# Patient Record
Sex: MALE | HISPANIC OR LATINO | Employment: FULL TIME | URBAN - METROPOLITAN AREA
[De-identification: names, ages, dates, MRNs, and addresses within clinical notes are randomized per-mention and may not be internally consistent; named-entity substitution may affect disease eponyms.]

---

## 2017-06-07 ENCOUNTER — OFFICE VISIT (OUTPATIENT)
Dept: MEDICAL GROUP | Facility: PHYSICIAN GROUP | Age: 22
End: 2017-06-07
Payer: COMMERCIAL

## 2017-06-07 VITALS
HEIGHT: 72 IN | TEMPERATURE: 98.6 F | OXYGEN SATURATION: 96 % | HEART RATE: 86 BPM | RESPIRATION RATE: 16 BRPM | DIASTOLIC BLOOD PRESSURE: 80 MMHG | BODY MASS INDEX: 35.76 KG/M2 | SYSTOLIC BLOOD PRESSURE: 118 MMHG | WEIGHT: 264 LBS

## 2017-06-07 DIAGNOSIS — Z76.89 ENCOUNTER TO ESTABLISH CARE WITH NEW DOCTOR: ICD-10-CM

## 2017-06-07 DIAGNOSIS — L30.9 ECZEMA, UNSPECIFIED TYPE: ICD-10-CM

## 2017-06-07 PROCEDURE — 99203 OFFICE O/P NEW LOW 30 MIN: CPT | Performed by: NURSE PRACTITIONER

## 2017-06-07 ASSESSMENT — PATIENT HEALTH QUESTIONNAIRE - PHQ9: CLINICAL INTERPRETATION OF PHQ2 SCORE: 0

## 2017-06-07 NOTE — MR AVS SNAPSHOT
Laci Foster   2017 9:00 AM   Office Visit   MRN: [id number]    Department:  [facility]   Dept Phone:  [phone]    Description:  Male : 1995   Provider:  GEETHA Mccall           Reason for Visit     Establish Care           Allergies as of 2017     Allergen Noted Reactions    No Known Drug Allergy 2008         You were diagnosed with     Encounter to establish care with new doctor   [[id number]]       BMI 35.0-35.9,adult   [459209]         Vital Signs     Blood Pressure Pulse Temperature Respirations Height Weight    118/80 mmHg 86 37 °C (98.6 °F) 16 1.829 m (6') 119.75 kg (264 lb)    Body Mass Index Oxygen Saturation Smoking Status             35.80 kg/m2 96% Never Smoker          Basic Information     Date Of Birth Sex Race Ethnicity Preferred Language    1995 Male  or   Origin (Albanian,Yemeni,Tongan,Barry, etc) English      Problem List              ICD-10-CM Priority Class Noted - Resolved    ASTHMA    2009 - Present    Allergic rhinitis    2009 - Present    Eczema L30.9   2010 - Present    Encounter to establish care with new doctor Z71.89   2017 - Present    BMI 35.0-35.9,adult Z68.35   2017 - Present      Health Maintenance        Date Due Completion Dates    IMM HEP B VACCINE (1 of 3 - Primary Series) 1995 ---    IMM HEP A VACCINE (1 of 2 - Standard Series) 1998 ---    IMM HPV VACCINE (1 of 3 - Male 3 Dose Series) 2008 ---    IMM VARICELLA (CHICKENPOX) VACCINE (1 of 2 - 2 Dose Adolescent Series) 2010 ---    IMM MENINGOCOCCAL VACCINE (MCV4) (2 of 2) 2013    IMM PNEUMOCOCCAL 19-64 (ADULT) MEDIUM RISK SERIES (1 of 1 - PPSV23) 2016 ---    IMM DTaP/Tdap/Td Vaccine (2 - Td) 2019            Current Immunizations     Meningococcal Conjugate Vaccine MCV4 (Menactra) 2009    Tdap Vaccine 2009      Below and/or attached are the medications your provider  expects you to take. Review all of your home medications and newly ordered medications with your provider and/or pharmacist. Follow medication instructions as directed by your provider and/or pharmacist. Please keep your medication list with you and share with your provider. Update the information when medications are discontinued, doses are changed, or new medications (including over-the-counter products) are added; and carry medication information at all times in the event of emergency situations     Allergies:  NO KNOWN DRUG ALLERGY - (reactions not documented)               Medications  Valid as of: June 07, 2017 -  9:18 AM    Generic Name Brand Name Tablet Size Instructions for use    .                 Medicines prescribed today were sent to:     [facility]ACY #[id number] - [place], [place] - [address]    [address] [place] [address]    Phone: [phone] Fax: [phone]    Open 24 Hours?: No      Medication refill instructions:       If your prescription bottle indicates you have medication refills left, it is not necessary to call your provider’s office. Please contact your pharmacy and they will refill your medication.    If your prescription bottle indicates you do not have any refills left, you may request refills at any time through one of the following ways: The online Vitasol system (except Urgent Care), by calling your provider’s office, or by asking your pharmacy to contact your provider’s office with a refill request. Medication refills are processed only during regular business hours and may not be available until the next business day. Your provider may request additional information or to have a follow-up visit with you prior to refilling your medication.   *Please Note: Medication refills are assigned a new Rx number when refilled electronically. Your pharmacy may indicate that no refills were authorized even though a new prescription for the same medication is available at the pharmacy.  Please request the medicine by name with the pharmacy before contacting your provider for a refill.           Graspr Access Code: [email or web address]  Expires: 7/7/2017  8:30 AM    Graspr  A secure, online tool to manage your health information     [employer]’s Graspr® is a secure, online tool that connects you to your personalized health information from the privacy of your home -- day or night - making it very easy for you to manage your healthcare. Once the activation process is completed, you can even access your medical information using the Graspr forest, which is available for free in the [employer] Forest store or Google Play store.     Graspr provides the following levels of access (as shown below):   My Chart Features   [facility] Doctor [facility]  Specialists [facility]  Urgent  Care Non-[facility]  Primary Care  Doctor   Email your healthcare team securely and privately 24/7 X X X    Manage appointments: schedule your next appointment; view details of past/upcoming appointments X      Request prescription refills. X      View recent personal medical records, including lab and immunizations X X X X   View health record, including health history, allergies, medications X X X X   Read reports about your outpatient visits, procedures, consult and ER notes X X X X   See your discharge summary, which is a recap of your hospital and/or ER visit that includes your diagnosis, lab results, and care plan. X X       How to register for Graspr:  1. Go to  [email or web address].  2. Click on the Sign Up Now box, which takes you to the New Member Sign Up page. You will need to provide the following information:  a. Enter your Graspr Access Code exactly as it appears at the top of this page. (You will not need to use this code after you’ve completed the sign-up process. If you do not sign up before the expiration date, you must request a new code.)   b. Enter your date of birth.   c. Enter your home email address.    d. Click Submit, and follow the next screen’s instructions.  3. Create a [employer] ID. This will be your [employer] login ID and cannot be changed, so think of one that is secure and easy to remember.  4. Create a [employer] password. You can change your password at any time.  5. Enter your Password Reset Question and Answer. This can be used at a later time if you forget your password.   6. Enter your e-mail address. This allows you to receive e-mail notifications when new information is available in [employer].  7. Click Sign Up. You can now view your health information.    For assistance activating your [employer] account, call [phone]

## 2017-06-07 NOTE — ASSESSMENT & PLAN NOTE
Seasonal allergies are well-controlled this year.  He hasn't been having issues with them for the past several years.

## 2017-06-07 NOTE — ASSESSMENT & PLAN NOTE
Hasn't used an inhaler in many years.  Can not recall the last time he felt short of breath or had issues with wheezing.

## 2017-06-07 NOTE — PROGRESS NOTES
Chief Complaint   Patient presents with   • Establish Care       HISTORY OF PRESENT ILLNESS: Patient is a 22 y.o. male new patient who presents today to discuss the following issues:    Encounter to establish care with new doctor  Is here to establish with a new primary care provider.  Was previously seen by Celia Jaime.      BMI 35.0-35.9,adult  Patient is aware of BMI elevation.  Brief discussion of diet, exercise, and lifestyle modification.      Eczema  Stable.  Gets a dry patch every once in a while, but nothing that has required treatment for many years.    Allergic Rhinitis  Seasonal allergies are well-controlled this year.  He hasn't been having issues with them for the past several years.    Asthma  Hasn't used an inhaler in many years.  Can not recall the last time he felt short of breath or had issues with wheezing.      Patient Active Problem List    Diagnosis Date Noted   • Encounter to establish care with new doctor 06/07/2017   • BMI 35.0-35.9,adult 06/07/2017   • Eczema 04/19/2010   • ASTHMA 07/31/2009   • Allergic rhinitis 07/31/2009       Allergies:No known drug allergy    No current outpatient prescriptions on file.     No current facility-administered medications for this visit.       Social History   Substance Use Topics   • Smoking status: Never Smoker    • Smokeless tobacco: Never Used   • Alcohol Use: 0.0 oz/week     0 Standard drinks or equivalent per week      Comment: rare       No family status information on file.     Family History   Problem Relation Age of Onset   • Cancer Maternal Aunt      breast x 2 aunts       Review of Systems:   Constitutional: Negative for fever, chills, weight loss and malaise/fatigue.   HENT: Negative for ear pain, nosebleeds, congestion, sore throat and neck pain.    Eyes: Negative for blurred vision.   Respiratory: Negative for cough, sputum production, shortness of breath and wheezing.    Cardiovascular: Negative for chest pain, palpitations,  orthopnea and leg swelling.   Gastrointestinal: Negative for heartburn, nausea, vomiting and abdominal pain.   Genitourinary: Negative for dysuria, urgency and frequency.   Musculoskeletal: Negative for myalgias, joint pain, and back pain.  Skin: Negative for rash and itching.   Neurological: Negative for dizziness, tingling, tremors, sensory change, focal weakness and headaches.   Endo/Heme/Allergies: Does not bruise/bleed easily.   Psychiatric/Behavioral: Negative for depression, suicidal ideas and memory loss.  The patient is not nervous/anxious and does not have insomnia.    All other systems reviewed and are negative except as in HPI.    Exam:  Blood pressure 118/80, pulse 86, temperature 37 °C (98.6 °F), resp. rate 16, height 1.829 m (6'), weight 119.75 kg (264 lb), SpO2 96 %.  General:  Well nourished, well developed male in NAD  Head: Grossly normal.  Neck: Supple without JVD or bruit. Thyroid is not enlarged.  Pulmonary: Clear to ausculation. Normal effort. No rales, ronchi, or wheezing.  Cardiovascular: Regular rate and rhythm without murmur.   Extremities: No clubbing, cyanosis, or edema.  Skin: Intact with no obvious rashes or lesions.  Neuro: Grossly intact.  Psych: Alert and oriented x 3.  Mood and affect appropriate.    Medical decision-making and discussion: Laci is here to establish with a new primary care provider.  We reviewed his past medical history and discussed his current medications.. He will sign a records release for his previous provider, he will sign up with [facility], and he will plan to follow-up here as needed.         Assessment/Plan:  1. Encounter to establish care with new doctor     2. BMI 35.0-35.9,adult  Patient identified as having weight management issue.  Appropriate orders and counseling given.   3. Eczema, unspecified type         Return if symptoms worsen or fail to improve.    Please note that this dictation was created using voice recognition software. I have made every  reasonable attempt to correct obvious errors, but I expect that there are errors of grammar and possibly content that I did not discover before finalizing the note.

## 2017-06-07 NOTE — ASSESSMENT & PLAN NOTE
Stable.  Gets a dry patch every once in a while, but nothing that has required treatment for many years.

## 2017-07-30 ENCOUNTER — OFFICE VISIT (OUTPATIENT)
Dept: URGENT CARE | Facility: PHYSICIAN GROUP | Age: 22
End: 2017-07-30
Payer: COMMERCIAL

## 2017-07-30 ENCOUNTER — HOSPITAL ENCOUNTER (OUTPATIENT)
Dept: RADIOLOGY | Facility: MEDICAL CENTER | Age: 22
End: 2017-07-30
Attending: FAMILY MEDICINE
Payer: COMMERCIAL

## 2017-07-30 ENCOUNTER — HOSPITAL ENCOUNTER (OUTPATIENT)
Facility: MEDICAL CENTER | Age: 22
End: 2017-07-30
Attending: FAMILY MEDICINE
Payer: COMMERCIAL

## 2017-07-30 VITALS
HEART RATE: 90 BPM | SYSTOLIC BLOOD PRESSURE: 116 MMHG | WEIGHT: 260 LBS | TEMPERATURE: 98.6 F | HEIGHT: 73 IN | OXYGEN SATURATION: 98 % | DIASTOLIC BLOOD PRESSURE: 80 MMHG | RESPIRATION RATE: 14 BRPM | BODY MASS INDEX: 34.46 KG/M2

## 2017-07-30 DIAGNOSIS — R19.8 UMBILICUS DISCHARGE: ICD-10-CM

## 2017-07-30 LAB
GRAM STN SPEC: NORMAL
SIGNIFICANT IND 70042: NORMAL
SITE SITE: NORMAL
SOURCE SOURCE: NORMAL

## 2017-07-30 PROCEDURE — 87070 CULTURE OTHR SPECIMN AEROBIC: CPT

## 2017-07-30 PROCEDURE — 99214 OFFICE O/P EST MOD 30 MIN: CPT | Performed by: FAMILY MEDICINE

## 2017-07-30 PROCEDURE — 87075 CULTR BACTERIA EXCEPT BLOOD: CPT

## 2017-07-30 PROCEDURE — 76705 ECHO EXAM OF ABDOMEN: CPT

## 2017-07-30 PROCEDURE — 87205 SMEAR GRAM STAIN: CPT

## 2017-07-30 RX ORDER — AMOXICILLIN 500 MG/1
500 CAPSULE ORAL 3 TIMES DAILY
Qty: 21 CAP | Refills: 0 | Status: SHIPPED | OUTPATIENT
Start: 2017-07-30 | End: 2017-08-06

## 2017-07-30 NOTE — MR AVS SNAPSHOT
"        Laci Foster   2017 9:30 AM   Office Visit   MRN: [id number]    Department:  [facility]   Dept Phone:  [phone]    Description:  Male : 1995   Provider:  Jose Alejandro Novak M.D.           Reason for Visit     Other irritation, bleeding from navel x2 days      Allergies as of 2017     Allergen Noted Reactions    No Known Drug Allergy 2008         You were diagnosed with     Umbilicus discharge   [812221]         Vital Signs     Blood Pressure Pulse Temperature Respirations Height Weight    116/80 mmHg 90 37 °C (98.6 °F) 14 1.854 m (6' 1\") 117.935 kg (260 lb)    Body Mass Index Oxygen Saturation Smoking Status             34.31 kg/m2 98% Never Smoker          Basic Information     Date Of Birth Sex Race Ethnicity Preferred Language    1995 Male  or   Origin (St Helenian,Niuean,Mauritanian,Belarusian, etc) English      Your appointments     2017 11:30 AM   US BODY 30 with VISTA US 1   [facility]AG[facility]IST[facility] ([place])    [address] [facility] [id number] [phone]              Problem List              ICD-10-CM Priority Class Noted - Resolved    ASTHMA    2009 - Present    Allergic rhinitis    2009 - Present    Eczema L30.9   2010 - Present    Encounter to establish care with new doctor Z71.89   2017 - Present    BMI 35.0-35.9,adult Z68.35   2017 - Present      Health Maintenance        Date Due Completion Dates    IMM HEP B VACCINE (1 of 3 - Primary Series) 1997 ---    IMM HEP A VACCINE (1 of 2 - Standard Series) 1998 ---    IMM HPV VACCINE (1 of 3 - Male 3 Dose Series) 2008 ---    IMM VARICELLA (CHICKENPOX) VACCINE (1 of 2 - 2 Dose Adolescent Series) 2010 ---    IMM MENINGOCOCCAL VACCINE (MCV4) (2 of 2) 2013    IMM PNEUMOCOCCAL 19-64 (ADULT) MEDIUM RISK SERIES (1 of 1 - PPSV23) 2016 ---    IMM INFLUENZA (1) 2017 ---    IMM DTaP/Tdap/Td Vaccine (2 - Td) 2019            Current " Immunizations     Meningococcal Conjugate Vaccine MCV4 (Menactra) 7/31/2009    Tdap Vaccine 7/31/2009      Below and/or attached are the medications your provider expects you to take. Review all of your home medications and newly ordered medications with your provider and/or pharmacist. Follow medication instructions as directed by your provider and/or pharmacist. Please keep your medication list with you and share with your provider. Update the information when medications are discontinued, doses are changed, or new medications (including over-the-counter products) are added; and carry medication information at all times in the event of emergency situations     Allergies:  NO KNOWN DRUG ALLERGY - (reactions not documented)               Medications  Valid as of: July 30, 2017 - 10:10 AM    Generic Name Brand Name Tablet Size Instructions for use    Amoxicillin (Cap) AMOXIL 500 MG Take 1 Cap by mouth 3 times a day for 7 days.        .                 Medicines prescribed today were sent to:     [id number] - [place], [place] - [address] [facility].    [address] [facility] [place] [address]    Phone: [phone] Fax: [phone]    Open 24 Hours?: No      Medication refill instructions:       If your prescription bottle indicates you have medication refills left, it is not necessary to call your provider’s office. Please contact your pharmacy and they will refill your medication.    If your prescription bottle indicates you do not have any refills left, you may request refills at any time through one of the following ways: The online Taggled system (except Urgent Care), by calling your provider’s office, or by asking your pharmacy to contact your provider’s office with a refill request. Medication refills are processed only during regular business hours and may not be available until the next business day. Your provider may request additional information or to have a follow-up visit with you prior to refilling your  medication.   *Please Note: Medication refills are assigned a new Rx number when refilled electronically. Your pharmacy may indicate that no refills were authorized even though a new prescription for the same medication is available at the pharmacy. Please request the medicine by name with the pharmacy before contacting your provider for a refill.        Your To Do List     Future Labs/Procedures Complete By Expires    US-HERNIA ABDOMEN  As directed 7/30/2018         [email or web address] Access Code: Activation code not generated  Current [email or web address] Status: Active

## 2017-07-30 NOTE — Clinical Note
July 30, 2017       Patient: Laci Foster   YOB: 1995   Date of Visit: 7/30/2017         To Whom It May Concern:    It is my medical opinion that Laci Foster not  lift more than 10 lbs x 1 week.    If you have any questions or concerns, please don't hesitate to call [phone]          Sincerely,          Jose Alejandro Novak M.D.  Electronically Signed

## 2017-07-30 NOTE — PROGRESS NOTES
"SUBJECTIVE      Chief Complaint   Patient presents with   • Other     irritation, bleeding from navel x2 days                  This is a new problem.  C/o bleeding from naval x 2 d.   Denies trauma, foreign body.  The problem has been gradually worsening since onset. Pain location: umbilicus - constant \"soreness\", sore to the touch and draining bloody fluid.   Pt was exposed to nothing. Pertinent negatives include no congestion, cough, fatigue, fever or shortness of breath. Past treatments include nothing.     History   Substance Use Topics   • Smoking status: Never Smoker    • Smokeless tobacco: No    • Alcohol Use: No      Past medical history was unremarkable and not pertinent to current issue      Family History   Problem Relation Age of Onset   • Cancer Maternal Aunt      breast x 2 aunts         Review of Systems   Constitutional: Negative for fever and fatigue.   HENT: Negative for congestion.    Respiratory: Negative for cough and shortness of breath.    Cardiovascular: Negative for chest pain.   Gastrointestinal: Negative for abdominal pain.   Skin: Positive for rash.   Neurological: Negative for dizziness.   All other systems reviewed and are negative.         Objective:     Blood pressure 116/80, pulse 90, temperature 37 °C (98.6 °F), resp. rate 14, height 1.854 m (6' 1\"), weight 117.935 kg (260 lb), SpO2 98 %.      Physical Exam   Constitutional: pt is oriented to person, place, and time. Pt appears well-developed and well-nourished. No distress.   HENT:   Head: Normocephalic and atraumatic.   Eyes: Conjunctivae are normal. No scleral icterus.   Cardiovascular: Normal rate and regular rhythm.    Pulmonary/Chest: Effort normal and breath sounds normal. No respiratory distress.        Neurological: pt is alert and oriented to person, place, and time. No cranial nerve deficit.   Skin: Skin is warm. Pt is not diaphoretic.       umbilicus - there is no erythema.   serosanguinous drainage noted. + TTP.   There " is no obvious hernia      Nursing note and vitals reviewed.              Assessment/Plan:          1. Umbilicus discharge  U/s ordered to rule out hernia  Will start on amoxicillin for the cellulitis    - ANAEROBIC/AEROBIC/GRAM STAIN  - US-HERNIA ABDOMEN; Future  - amoxicillin (AMOXIL) 500 MG Cap; Take 1 Cap by mouth 3 times a day for 7 days.  Dispense: 21 Cap; Refill: 0

## 2017-08-01 LAB
BACTERIA WND AEROBE CULT: NORMAL
GRAM STN SPEC: NORMAL
SIGNIFICANT IND 70042: NORMAL
SITE SITE: NORMAL
SOURCE SOURCE: NORMAL

## 2017-08-02 LAB
BACTERIA SPEC ANAEROBE CULT: NORMAL
SIGNIFICANT IND 70042: NORMAL
SITE SITE: NORMAL
SOURCE SOURCE: NORMAL

## 2019-09-25 ENCOUNTER — OFFICE VISIT (OUTPATIENT)
Dept: MEDICAL GROUP | Facility: PHYSICIAN GROUP | Age: 24
End: 2019-09-25
Payer: COMMERCIAL

## 2019-09-25 VITALS
HEIGHT: 72 IN | HEART RATE: 89 BPM | TEMPERATURE: 98.9 F | WEIGHT: 265 LBS | OXYGEN SATURATION: 95 % | SYSTOLIC BLOOD PRESSURE: 122 MMHG | RESPIRATION RATE: 16 BRPM | DIASTOLIC BLOOD PRESSURE: 82 MMHG | BODY MASS INDEX: 35.89 KG/M2

## 2019-09-25 DIAGNOSIS — Z98.890 HISTORY OF FOOT SURGERY: ICD-10-CM

## 2019-09-25 DIAGNOSIS — Z00.00 ENCOUNTER FOR WELLNESS EXAMINATION: ICD-10-CM

## 2019-09-25 DIAGNOSIS — Z23 NEED FOR VACCINATION: ICD-10-CM

## 2019-09-25 PROBLEM — Z76.89 ENCOUNTER TO ESTABLISH CARE WITH NEW DOCTOR: Status: RESOLVED | Noted: 2017-06-07 | Resolved: 2019-09-25

## 2019-09-25 PROCEDURE — 90686 IIV4 VACC NO PRSV 0.5 ML IM: CPT | Performed by: NURSE PRACTITIONER

## 2019-09-25 PROCEDURE — 99212 OFFICE O/P EST SF 10 MIN: CPT | Mod: 25 | Performed by: NURSE PRACTITIONER

## 2019-09-25 PROCEDURE — 90471 IMMUNIZATION ADMIN: CPT | Performed by: NURSE PRACTITIONER

## 2019-09-25 PROCEDURE — 90472 IMMUNIZATION ADMIN EACH ADD: CPT | Performed by: NURSE PRACTITIONER

## 2019-09-25 PROCEDURE — 90715 TDAP VACCINE 7 YRS/> IM: CPT | Performed by: NURSE PRACTITIONER

## 2019-09-25 ASSESSMENT — PATIENT HEALTH QUESTIONNAIRE - PHQ9: CLINICAL INTERPRETATION OF PHQ2 SCORE: 0

## 2019-09-25 NOTE — PROGRESS NOTES
Chief Complaint   Patient presents with   • Annual Exam   • Foot Problem       HISTORY OF PRESENT ILLNESS: Patient is a 24 y.o. male established patient who presents today to discuss the following issues:    Encounter for wellness examination  Is here for his annual wellness visit.  Has no current complaints.    History of foot surgery  Had left foot surgery in 2008 and right foot surgery in 2009 with Dr. Matos.  He was told that he would need surgery again in the future, but he doesn't remember when.  Discussed options, and we will proceed with a referral back to Dr. Matos for evaluation.    Need for vaccination  Due for flu and Tdap today.      Patient Active Problem List    Diagnosis Date Noted   • History of foot surgery 09/25/2019   • Need for vaccination 09/25/2019   • Encounter for wellness examination 09/25/2019   • BMI 35.0-35.9,adult 06/07/2017   • Eczema 04/19/2010   • ASTHMA 07/31/2009   • Allergic rhinitis 07/31/2009       Allergies:No known drug allergy    No current outpatient medications on file.     No current facility-administered medications for this visit.        Social History     Tobacco Use   • Smoking status: Never Smoker   • Smokeless tobacco: Never Used   Substance Use Topics   • Alcohol use: Yes     Alcohol/week: 0.0 oz     Comment: rare   • Drug use: Yes     Types: Marijuana       Family Status   Relation Name Status   • MAunt  (Not Specified)     Family History   Problem Relation Age of Onset   • Cancer Maternal Aunt         breast x 2 aunts       Review of Systems:   Constitutional: Negative for fever, chills, weight loss and malaise/fatigue.   HENT: Negative for ear pain, nosebleeds, congestion, sore throat and neck pain.    Eyes: Negative for blurred vision.   Respiratory: Negative for cough, sputum production, shortness of breath and wheezing.    Cardiovascular: Negative for chest pain, palpitations, orthopnea and leg swelling.   Gastrointestinal: Negative for heartburn, nausea,  vomiting and abdominal pain.   Genitourinary: Negative for dysuria, urgency and frequency.   Musculoskeletal: Negative for myalgias, joint pain, and back pain.  Skin: Negative for rash and itching.   Neurological: Negative for dizziness, tingling, tremors, sensory change, focal weakness and headaches.   Endo/Heme/Allergies: Does not bruise/bleed easily.   Psychiatric/Behavioral: Negative for depression, suicidal ideas and memory loss.  The patient is not nervous/anxious and does not have insomnia.    All other systems reviewed and are negative except as in HPI.    Exam:  Blood Pressure 122/82   Pulse 89   Temperature 37.2 °C (98.9 °F)   Respiration 16   Height 1.829 m (6')   Weight 120.2 kg (265 lb)   Oxygen Saturation 95%   General:  Well nourished, well developed male in NAD  Head: Grossly normal.  Neck: Supple without JVD or bruit. Thyroid is not enlarged.  Pulmonary: Clear to ausculation. Normal effort. No rales, ronchi, or wheezing.  Cardiovascular: Regular rate and rhythm without murmur.   Abdomen:  Soft, nontender, nondistended.  Extremities: No clubbing, cyanosis, or edema.  Skin: Intact with no obvious rashes or lesions.  Neuro: Grossly intact.  Psych: Alert and oriented x 3.  Mood and affect appropriate.    Medical decision-making and discussion: Laci is here today to discuss wellness.  His records were reviewed, a referral was sent to Dr. Matos, and he was given flu and Tdap shots.  He will follow-up here as needed.    I have placed the below orders and discussed them with an approved delegating provider. The MA is performing the below orders under the direction of Dr. Cordova, who have provided verbal consent for supervision.            Assessment/Plan:  1. Need for vaccination  TDAP VACCINE =>8YO IM    Influenza Vaccine Quad Injection (PF)   2. History of foot surgery  REFERRAL TO PODIATRY   3. Encounter for wellness examination         Return if symptoms worsen or fail to improve.    Please note  that this dictation was created using voice recognition software. I have made every reasonable attempt to correct obvious errors, but I expect that there are errors of grammar and possibly content that I did not discover before finalizing the note.

## 2019-09-26 ENCOUNTER — TELEPHONE (OUTPATIENT)
Dept: MEDICAL GROUP | Facility: PHYSICIAN GROUP | Age: 24
End: 2019-09-26

## 2019-09-26 DIAGNOSIS — Z00.00 ENCOUNTER FOR WELLNESS EXAMINATION: ICD-10-CM

## 2019-09-26 NOTE — TELEPHONE ENCOUNTER
----- Message from Laci Foster sent at 9/25/2019  5:50 PM PDT -----  Regarding: Test Result Question  Contact: [phone]  Bennett Mercado,     I had an appointment earlier and I forgot to mention I needed a order put in to have my blood checked for cholesterol for my insurance for the yearly wellness check. I was hoping if you could put one in so I can come in sometime soon to have it done.    Thank you!

## 2019-09-26 NOTE — ASSESSMENT & PLAN NOTE
Had left foot surgery in 2008 and right foot surgery in 2009 with Dr. Matos.  He was told that he would need surgery again in the future, but he doesn't remember when.  Discussed options, and we will proceed with a referral back to Dr. Matos for evaluation.

## 2019-11-14 ENCOUNTER — HOSPITAL ENCOUNTER (OUTPATIENT)
Dept: LAB | Facility: MEDICAL CENTER | Age: 24
End: 2019-11-14
Attending: NURSE PRACTITIONER
Payer: COMMERCIAL

## 2019-11-14 DIAGNOSIS — Z00.00 ENCOUNTER FOR WELLNESS EXAMINATION: ICD-10-CM

## 2019-11-14 LAB
CHOLEST SERPL-MCNC: 163 MG/DL (ref 100–199)
FASTING STATUS PATIENT QL REPORTED: NORMAL
HDLC SERPL-MCNC: 39 MG/DL
LDLC SERPL CALC-MCNC: 93 MG/DL
TRIGL SERPL-MCNC: 157 MG/DL (ref 0–149)

## 2019-11-14 PROCEDURE — 80061 LIPID PANEL: CPT

## 2019-11-14 PROCEDURE — 36415 COLL VENOUS BLD VENIPUNCTURE: CPT
